# Patient Record
Sex: MALE | Race: WHITE | NOT HISPANIC OR LATINO | Employment: FULL TIME | URBAN - METROPOLITAN AREA
[De-identification: names, ages, dates, MRNs, and addresses within clinical notes are randomized per-mention and may not be internally consistent; named-entity substitution may affect disease eponyms.]

---

## 2024-11-08 ENCOUNTER — LAB VISIT (OUTPATIENT)
Dept: LAB | Facility: HOSPITAL | Age: 39
End: 2024-11-08
Attending: NURSE PRACTITIONER
Payer: COMMERCIAL

## 2024-11-08 DIAGNOSIS — E78.5 HYPERLIPIDEMIA, UNSPECIFIED HYPERLIPIDEMIA TYPE: Primary | ICD-10-CM

## 2024-11-08 DIAGNOSIS — I10 ESSENTIAL HYPERTENSION, MALIGNANT: ICD-10-CM

## 2024-11-08 LAB
ALBUMIN SERPL-MCNC: 3.8 G/DL (ref 3.5–5)
ALBUMIN/GLOB SERPL: 1.5 RATIO (ref 1.1–2)
ALP SERPL-CCNC: 71 UNIT/L (ref 40–150)
ALT SERPL-CCNC: 42 UNIT/L (ref 0–55)
ANION GAP SERPL CALC-SCNC: 7 MEQ/L
AST SERPL-CCNC: 25 UNIT/L (ref 5–34)
BASOPHILS # BLD AUTO: 0.04 X10(3)/MCL
BASOPHILS NFR BLD AUTO: 0.6 %
BILIRUB SERPL-MCNC: 0.4 MG/DL
BUN SERPL-MCNC: 33.3 MG/DL (ref 8.9–20.6)
CALCIUM SERPL-MCNC: 9.3 MG/DL (ref 8.4–10.2)
CHLORIDE SERPL-SCNC: 105 MMOL/L (ref 98–107)
CHOLEST SERPL-MCNC: 207 MG/DL
CHOLEST/HDLC SERPL: 6 {RATIO} (ref 0–5)
CO2 SERPL-SCNC: 27 MMOL/L (ref 22–29)
CREAT SERPL-MCNC: 1.97 MG/DL (ref 0.72–1.25)
CREAT/UREA NIT SERPL: 17
EOSINOPHIL # BLD AUTO: 0.16 X10(3)/MCL (ref 0–0.9)
EOSINOPHIL NFR BLD AUTO: 2.4 %
ERYTHROCYTE [DISTWIDTH] IN BLOOD BY AUTOMATED COUNT: 12.6 % (ref 11.5–17)
GFR SERPLBLD CREATININE-BSD FMLA CKD-EPI: 44 ML/MIN/1.73/M2
GLOBULIN SER-MCNC: 2.6 GM/DL (ref 2.4–3.5)
GLUCOSE SERPL-MCNC: 88 MG/DL (ref 74–100)
HCT VFR BLD AUTO: 40.9 % (ref 42–52)
HDLC SERPL-MCNC: 34 MG/DL (ref 35–60)
HGB BLD-MCNC: 13.8 G/DL (ref 14–18)
IMM GRANULOCYTES # BLD AUTO: 0.01 X10(3)/MCL (ref 0–0.04)
IMM GRANULOCYTES NFR BLD AUTO: 0.1 %
LDLC SERPL CALC-MCNC: 134 MG/DL (ref 50–140)
LYMPHOCYTES # BLD AUTO: 1.88 X10(3)/MCL (ref 0.6–4.6)
LYMPHOCYTES NFR BLD AUTO: 28.1 %
MCH RBC QN AUTO: 30.9 PG (ref 27–31)
MCHC RBC AUTO-ENTMCNC: 33.7 G/DL (ref 33–36)
MCV RBC AUTO: 91.7 FL (ref 80–94)
MONOCYTES # BLD AUTO: 0.61 X10(3)/MCL (ref 0.1–1.3)
MONOCYTES NFR BLD AUTO: 9.1 %
NEUTROPHILS # BLD AUTO: 4 X10(3)/MCL (ref 2.1–9.2)
NEUTROPHILS NFR BLD AUTO: 59.7 %
PLATELET # BLD AUTO: 203 X10(3)/MCL (ref 130–400)
PMV BLD AUTO: 10.5 FL (ref 7.4–10.4)
POTASSIUM SERPL-SCNC: 4.2 MMOL/L (ref 3.5–5.1)
PROT SERPL-MCNC: 6.4 GM/DL (ref 6.4–8.3)
RBC # BLD AUTO: 4.46 X10(6)/MCL (ref 4.7–6.1)
SODIUM SERPL-SCNC: 139 MMOL/L (ref 136–145)
T4 FREE SERPL-MCNC: 1.2 NG/DL (ref 0.7–1.48)
TRIGL SERPL-MCNC: 196 MG/DL (ref 34–140)
TSH SERPL-ACNC: 1.34 UIU/ML (ref 0.35–4.94)
VLDLC SERPL CALC-MCNC: 39 MG/DL
WBC # BLD AUTO: 6.7 X10(3)/MCL (ref 4.5–11.5)

## 2024-11-08 PROCEDURE — 80053 COMPREHEN METABOLIC PANEL: CPT

## 2024-11-08 PROCEDURE — 84439 ASSAY OF FREE THYROXINE: CPT

## 2024-11-08 PROCEDURE — 85025 COMPLETE CBC W/AUTO DIFF WBC: CPT

## 2024-11-08 PROCEDURE — 84443 ASSAY THYROID STIM HORMONE: CPT

## 2024-11-08 PROCEDURE — 36415 COLL VENOUS BLD VENIPUNCTURE: CPT

## 2024-11-08 PROCEDURE — 80061 LIPID PANEL: CPT

## 2024-11-10 ENCOUNTER — HOSPITAL ENCOUNTER (EMERGENCY)
Facility: HOSPITAL | Age: 39
Discharge: HOME | End: 2024-11-11
Payer: COMMERCIAL

## 2024-11-10 DIAGNOSIS — I10 HIGH BLOOD PRESSURE: ICD-10-CM

## 2024-11-10 LAB
ALBUMIN SERPL-MCNC: 3.9 G/DL (ref 3.5–5)
ALBUMIN/GLOB SERPL: 1.3 RATIO (ref 1.1–2)
ALP SERPL-CCNC: 67 UNIT/L (ref 40–150)
ALT SERPL-CCNC: 33 UNIT/L (ref 0–55)
ANION GAP SERPL CALC-SCNC: 11 MEQ/L
AST SERPL-CCNC: 22 UNIT/L (ref 5–34)
BASOPHILS # BLD AUTO: 0.04 X10(3)/MCL
BASOPHILS NFR BLD AUTO: 0.5 %
BILIRUB SERPL-MCNC: 0.6 MG/DL
BNP BLD-MCNC: 35.9 PG/ML
BUN SERPL-MCNC: 32.7 MG/DL (ref 8.9–20.6)
CALCIUM SERPL-MCNC: 9.2 MG/DL (ref 8.4–10.2)
CHLORIDE SERPL-SCNC: 106 MMOL/L (ref 98–107)
CO2 SERPL-SCNC: 21 MMOL/L (ref 22–29)
CREAT SERPL-MCNC: 1.61 MG/DL (ref 0.72–1.25)
CREAT/UREA NIT SERPL: 20
EOSINOPHIL # BLD AUTO: 0.16 X10(3)/MCL (ref 0–0.9)
EOSINOPHIL NFR BLD AUTO: 1.9 %
ERYTHROCYTE [DISTWIDTH] IN BLOOD BY AUTOMATED COUNT: 12.4 % (ref 11.5–17)
GFR SERPLBLD CREATININE-BSD FMLA CKD-EPI: 56 ML/MIN/1.73/M2
GLOBULIN SER-MCNC: 3.1 GM/DL (ref 2.4–3.5)
GLUCOSE SERPL-MCNC: 88 MG/DL (ref 74–100)
HCT VFR BLD AUTO: 40.4 % (ref 42–52)
HGB BLD-MCNC: 13.5 G/DL (ref 14–18)
IMM GRANULOCYTES # BLD AUTO: 0.01 X10(3)/MCL (ref 0–0.04)
IMM GRANULOCYTES NFR BLD AUTO: 0.1 %
LYMPHOCYTES # BLD AUTO: 2.22 X10(3)/MCL (ref 0.6–4.6)
LYMPHOCYTES NFR BLD AUTO: 25.7 %
MCH RBC QN AUTO: 31.1 PG (ref 27–31)
MCHC RBC AUTO-ENTMCNC: 33.4 G/DL (ref 33–36)
MCV RBC AUTO: 93.1 FL (ref 80–94)
MONOCYTES # BLD AUTO: 0.61 X10(3)/MCL (ref 0.1–1.3)
MONOCYTES NFR BLD AUTO: 7.1 %
NEUTROPHILS # BLD AUTO: 5.6 X10(3)/MCL (ref 2.1–9.2)
NEUTROPHILS NFR BLD AUTO: 64.7 %
PLATELET # BLD AUTO: 198 X10(3)/MCL (ref 130–400)
PMV BLD AUTO: 11.4 FL (ref 7.4–10.4)
POTASSIUM SERPL-SCNC: 4.3 MMOL/L (ref 3.5–5.1)
PROT SERPL-MCNC: 7 GM/DL (ref 6.4–8.3)
RBC # BLD AUTO: 4.34 X10(6)/MCL (ref 4.7–6.1)
SODIUM SERPL-SCNC: 138 MMOL/L (ref 136–145)
TROPONIN I SERPL-MCNC: 0.03 NG/ML (ref 0–0.04)
WBC # BLD AUTO: 8.64 X10(3)/MCL (ref 4.5–11.5)

## 2024-11-10 PROCEDURE — 99284 EMERGENCY DEPT VISIT MOD MDM: CPT | Mod: 25

## 2024-11-10 PROCEDURE — 25000003 PHARM REV CODE 250

## 2024-11-10 PROCEDURE — 84484 ASSAY OF TROPONIN QUANT: CPT

## 2024-11-10 PROCEDURE — 93010 ELECTROCARDIOGRAM REPORT: CPT | Mod: ,,, | Performed by: INTERNAL MEDICINE

## 2024-11-10 PROCEDURE — 83880 ASSAY OF NATRIURETIC PEPTIDE: CPT

## 2024-11-10 PROCEDURE — 93005 ELECTROCARDIOGRAM TRACING: CPT

## 2024-11-10 PROCEDURE — 85025 COMPLETE CBC W/AUTO DIFF WBC: CPT

## 2024-11-10 PROCEDURE — 80053 COMPREHEN METABOLIC PANEL: CPT

## 2024-11-10 RX ORDER — LISINOPRIL 10 MG/1
20 TABLET ORAL
Status: COMPLETED | OUTPATIENT
Start: 2024-11-10 | End: 2024-11-10

## 2024-11-10 RX ORDER — AMLODIPINE BESYLATE 5 MG/1
5 TABLET ORAL
Status: COMPLETED | OUTPATIENT
Start: 2024-11-10 | End: 2024-11-10

## 2024-11-10 RX ORDER — HYDROCHLOROTHIAZIDE 25 MG/1
25 TABLET ORAL ONCE
Status: COMPLETED | OUTPATIENT
Start: 2024-11-10 | End: 2024-11-10

## 2024-11-10 RX ORDER — LISINOPRIL AND HYDROCHLOROTHIAZIDE 20; 25 MG/1; MG/1
1 TABLET ORAL DAILY
Qty: 30 TABLET | Refills: 0 | Status: SHIPPED | OUTPATIENT
Start: 2024-11-10 | End: 2024-12-10

## 2024-11-10 RX ORDER — AMLODIPINE BESYLATE 5 MG/1
5 TABLET ORAL DAILY
Qty: 30 TABLET | Refills: 0 | Status: SHIPPED | OUTPATIENT
Start: 2024-11-10 | End: 2024-12-10

## 2024-11-10 RX ADMIN — HYDROCHLOROTHIAZIDE 25 MG: 25 TABLET ORAL at 10:11

## 2024-11-10 RX ADMIN — AMLODIPINE BESYLATE 5 MG: 5 TABLET ORAL at 10:11

## 2024-11-10 RX ADMIN — LISINOPRIL 20 MG: 10 TABLET ORAL at 10:11

## 2024-11-11 VITALS
SYSTOLIC BLOOD PRESSURE: 175 MMHG | WEIGHT: 254 LBS | HEIGHT: 72 IN | HEART RATE: 81 BPM | OXYGEN SATURATION: 99 % | TEMPERATURE: 98 F | BODY MASS INDEX: 34.4 KG/M2 | RESPIRATION RATE: 18 BRPM | DIASTOLIC BLOOD PRESSURE: 127 MMHG

## 2024-11-11 NOTE — ED PROVIDER NOTES
Encounter Date: 11/10/2024       History     Chief Complaint   Patient presents with    Hypertension     Pt c/o elevated BP reading at home. Pt ran out of BP med and just recently restarted it but continues to have high readings. Pt also c/o HA. Neuro grossly intact, speech clear, denies blurred vision, CP or SOB.     38-year-old male with past medical history of hypertension presents to the ED due to high blood pressure.  Patient states that yesterday his blood pressure systolic was in the 160s.  Today patient has been checking his blood pressure and systolic has been ranging from the 170s to 180s.  Patient states he supposed to be on lisinopril 20 mg and hydrochlorothiazide 12.5 mg however he was out of the medication for 3 weeks.  Patient states he recently restarted the medication a few days ago.  Patient was seen by his primary care provider a few days ago and was restarted on his medication.  Patient denies fever, chills, chest pain, shortness of breath, abdominal pain, vomiting or diarrhea.  Denies numbness, tingling or weakness.        Review of patient's allergies indicates:  No Known Allergies  No past medical history on file.  No past surgical history on file.  No family history on file.     Review of Systems   Constitutional:  Negative for chills and fever.   Respiratory:  Negative for cough and shortness of breath.    Cardiovascular:  Negative for chest pain, palpitations and leg swelling.   Gastrointestinal:  Negative for abdominal pain, diarrhea, nausea and vomiting.   Neurological:  Negative for dizziness, weakness, numbness and headaches.       Physical Exam     Initial Vitals [11/10/24 1932]   BP Pulse Resp Temp SpO2   (!) 187/120 76 18 97.9 °F (36.6 °C) 100 %      MAP       --         Physical Exam    Nursing note and vitals reviewed.  Constitutional: He appears well-developed and well-nourished. He is not diaphoretic.   HENT:   Head: Normocephalic and atraumatic.   Eyes: Conjunctivae and EOM are  normal. Pupils are equal, round, and reactive to light.   Neck: Neck supple.   Normal range of motion.  Cardiovascular:  Normal rate and regular rhythm.           Radial and DP pulses are intact.   Pulmonary/Chest: Breath sounds normal. No respiratory distress. He has no wheezes.   Abdominal: Abdomen is soft. Bowel sounds are normal. There is no abdominal tenderness.   Musculoskeletal:         General: No edema.      Cervical back: Normal range of motion and neck supple.     Neurological: He is alert and oriented to person, place, and time. GCS score is 15. GCS eye subscore is 4. GCS verbal subscore is 5. GCS motor subscore is 6.   Skin: Skin is warm and dry.         ED Course   Procedures  Labs Reviewed   COMPREHENSIVE METABOLIC PANEL - Abnormal       Result Value    Sodium 138      Potassium 4.3      Chloride 106      CO2 21 (*)     Glucose 88      Blood Urea Nitrogen 32.7 (*)     Creatinine 1.61 (*)     Calcium 9.2      Protein Total 7.0      Albumin 3.9      Globulin 3.1      Albumin/Globulin Ratio 1.3      Bilirubin Total 0.6      ALP 67      ALT 33      AST 22      eGFR 56      Anion Gap 11.0      BUN/Creatinine Ratio 20     CBC WITH DIFFERENTIAL - Abnormal    WBC 8.64      RBC 4.34 (*)     Hgb 13.5 (*)     Hct 40.4 (*)     MCV 93.1      MCH 31.1 (*)     MCHC 33.4      RDW 12.4      Platelet 198      MPV 11.4 (*)     Neut % 64.7      Lymph % 25.7      Mono % 7.1      Eos % 1.9      Basophil % 0.5      Lymph # 2.22      Neut # 5.60      Mono # 0.61      Eos # 0.16      Baso # 0.04      IG# 0.01      IG% 0.1     TROPONIN I - Normal    Troponin-I 0.033     B-TYPE NATRIURETIC PEPTIDE - Normal    Natriuretic Peptide 35.9     CBC W/ AUTO DIFFERENTIAL    Narrative:     The following orders were created for panel order CBC auto differential.  Procedure                               Abnormality         Status                     ---------                               -----------         ------                     CBC  with Differential[[id number]]       Abnormal            Final result                 Please view results for these tests on the individual orders.          Imaging Results    None          Medications   lisinopriL tablet 20 mg (20 mg Oral Given 11/10/24 2240)   hydroCHLOROthiazide tablet 25 mg (25 mg Oral Given 11/10/24 2245)   amLODIPine tablet 5 mg (5 mg Oral Given 11/10/24 2240)     Medical Decision Making  See ED course for MDM.    Amount and/or Complexity of Data Reviewed  Labs: ordered.    Risk  Prescription drug management.               ED Course as of 11/10/24 2351   Sun Nov 10, 2024   0606 38-year-old male with past medical history of hypertension presents to the ED due to high blood pressure.  Patient states that yesterday his blood pressure systolic was in the 160s.  Today patient has been checking his blood pressure and systolic has been ranging from the 170s to 180s.  Patient states he supposed to be on lisinopril 20 mg and hydrochlorothiazide 12.5 mg however he was out of the medication for 3 weeks.  Patient states he recently restarted the medication a few days ago.  Patient was seen by his primary care provider a few days ago and was restarted on his medication.  Patient denies fever, chills, chest pain, shortness of breath, abdominal pain, vomiting or diarrhea.  Denies numbness, tingling or weakness.    On arrival patient is awake and alert.  Vital signs are stable.  Heart is regular rate and rhythm.  Lungs are clear.  Abdomen is soft nontender.  No swelling to lower extremities.    Differential diagnosis consists of but not limited to hypertensive emergency, hypertensive urgency, hypertension, medication noncompliance, end-organ damage.      Even though patient is asymptomatic, his systolic was in the 200s-will order basic lab work.    Patient's medication regimen is appropriate for how hypertensive he has been.  Home dose 20 mg of lisinopril order.  Patient is only on 12.5 mg of  hydrochlorothiazide-25 mg of this medication order.  5 mg of amlodipine also ordered. [NP]   2348 CBC does not show leukocytosis.  H and H is stable.  CMP does show creatinine 1.61-this is around patient's baseline.  No history of CKD.    No signs of liver injury electrolyte abnormality.      Troponin is within normal limits.  BNP is within normal limits.  EKG does not show any acute ischemic changes. [NP]   2349 EK beats per minute, normal sinus rhythm, W[place] interval 182, , no STEMI. [NP]   2349 Patient's blood pressure is down trending.  Initially his systolic was in the 200s.  Repeat blood pressure is 167/124.  Patient is resting comfortably in the ED.  He has no complaints at this time.  Denies headache, chest pain, shortness of breath, numbness, tingling or weakness. [NP]   2349 Patient educated on the importance of taking his blood pressure medication.  Educated patient that if he is not compliant with his medication, the skin eventually affect his heart and his kidneys. [NP]   2350 We will discharge patient home with lisinopril 20 mg, hydrochlorothiazide 25 mg and add on amlodipine at 5 mg.    Patient and patient's significant other both understand agree with changes to his medication.  Patient informed to check his blood pressure twice a day.  Patient has a follow up appointment at the PCP clinic to check his blood pressure.  Patient informed that he needs to go to the appointment. [NP]   2350 We will discharge patient home with strict return precautions.  Patient understands and agrees with treatment plan and discharge. [NP]      ED Course User Index  [NP] Renetta Celestin DO                           Clinical Impression:  Final diagnoses:  [I10] High blood pressure          ED Disposition Condition    Discharge Stable          ED Prescriptions       Medication Sig Dispense Start Date End Date Auth. Provider    lisinopriL-hydrochlorothiazide (PRINZIDE,ZESTORETIC) 20-25 mg Tab Take 1 tablet by  mouth once daily. 30 tablet 11/10/2024 12/10/2024 Renetta Celestin, DO    amLODIPine (NORVASC) 5 MG tablet Take 1 tablet (5 mg total) by mouth once daily. 30 tablet 11/10/2024 12/10/2024 Renetta Celestin DO          Follow-up Information    None          Renetta Celestin DO  11/10/24 [id number]

## 2024-11-11 NOTE — DISCHARGE INSTRUCTIONS
Please take your blood pressure medications as discussed today.    -lisinopril 20 mg, hydrochlorothiazide 25 mg, amlodipine 5 mg.    Follow-up with your primary care provider as discussed today.  If your symptoms worsen or you feel unwell please return to the ED.

## 2024-11-12 LAB
OHS QRS DURATION: 92 MS
OHS QTC CALCULATION: 443 MS